# Patient Record
(demographics unavailable — no encounter records)

---

## 2025-02-25 NOTE — CONSULT LETTER
[FreeTextEntry1] : Dear Dr. MILADIS CHARLES ,  I had the pleasure of consulting on LUCY ELLIS today.  Below is my note regarding the office visit today.  Thank you so very much for allowing me to participate in LUCY's  care.  Please don't hesitate to call me should any questions or issues arise .  Sincerely,   Yovani Bocanegra MD, FACS, [facility] Chief, Pediatric Urology Professor of Urology and Pediatrics [facility] [employer]  President, [employer] - [place] Section Past-President, Societies for Pediatric Urology

## 2025-02-25 NOTE — ASSESSMENT
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 I discussed the implications and management options including observation and surgery. The principles of the operation and the anticipated postoperative course were discussed.  After discussing the risks and benefits and possible complications (including but not limited to incomplete removal of the webbing of the penis, penile injury, bleeding, infection, penile deformity and need for additional surgery), the decision to proceed with web repair and circumcision surgery under general anesthesia was made when he reaches 6 months.  All questions were answered.

## 2025-02-25 NOTE — CONSULT LETTER
[FreeTextEntry1] : Dear Dr. MILADIS CHARLES ,  I had the pleasure of consulting on LUCY ELLIS today.  Below is my note regarding the office visit today.  Thank you so very much for allowing me to participate in LUCY's  care.  Please don't hesitate to call me should any questions or issues arise .  Sincerely,   Yovani Bocanegra MD, FACS, [facility] Chief, Pediatric Urology Professor of Urology and Pediatrics [facility] [employer]  President, [employer] - [place] Section Past-President, Societies for Pediatric Urology

## 2025-02-25 NOTE — HISTORY OF PRESENT ILLNESS
[TextBox_4] : LUCY is here today for evaluation. He was born at 35 weeks after an unassisted conception and uneventful pregnancy and delivery. 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 No changes to the penis have been noted. No issues making ample wet diapers. No infections. No family history of penile abnormalities.

## 2025-02-25 NOTE — CONSULT LETTER
[FreeTextEntry1] : Dear Dr. MILADIS CHARLES ,  I had the pleasure of consulting on LUCY ELLIS today.  Below is my note regarding the office visit today.  Thank you so very much for allowing me to participate in LUCY's  care.  Please don't hesitate to call me should any questions or issues arise .  Sincerely,   Yovani Bocanegra MD, FACS, [facility] Chief, Pediatric Urology Professor of Urology and Pediatrics [employer]  President, [employer] - [place] Section Past-President, Societies for Pediatric Urology

## 2025-02-25 NOTE — PHYSICAL EXAM
[TextBox_92] : PENIS:  Straight uncircumcised penis with phimosis. Meatus not visible. Penoscrotal webbing.   SCROTUM: Bilaterally symmetric testes in dependent position without palpable mass, hernia or hydrocele.

## 2025-02-25 NOTE — CONSULT LETTER
[FreeTextEntry1] : Dear Dr. MILADIS CHARLES ,  I had the pleasure of consulting on LUCY ELLIS today.  Below is my note regarding the office visit today.  Thank you so very much for allowing me to participate in LUCY's  care.  Please don't hesitate to call me should any questions or issues arise .  Sincerely,   Yovani Bocanegra MD, FACS, [facility] Chief, Pediatric Urology Professor of Urology and Pediatrics [facility] [employer]  President, [employer] - [place] Section Past-President, Societies [employer] Pediatric [employer]